# Patient Record
Sex: MALE | URBAN - METROPOLITAN AREA
[De-identification: names, ages, dates, MRNs, and addresses within clinical notes are randomized per-mention and may not be internally consistent; named-entity substitution may affect disease eponyms.]

---

## 2020-02-26 ENCOUNTER — APPOINTMENT (RX ONLY)
Dept: URBAN - METROPOLITAN AREA CLINIC 41 | Facility: CLINIC | Age: 55
Setting detail: DERMATOLOGY
End: 2020-02-26

## 2020-02-26 DIAGNOSIS — I78.8 OTHER DISEASES OF CAPILLARIES: ICD-10-CM

## 2020-02-26 DIAGNOSIS — Z85.828 PERSONAL HISTORY OF OTHER MALIGNANT NEOPLASM OF SKIN: ICD-10-CM

## 2020-02-26 PROBLEM — C44.311 BASAL CELL CARCINOMA OF SKIN OF NOSE: Status: ACTIVE | Noted: 2020-02-26

## 2020-02-26 PROCEDURE — ? COUNSELING

## 2020-02-26 PROCEDURE — 99202 OFFICE O/P NEW SF 15 MIN: CPT

## 2020-02-26 PROCEDURE — ? OTHER

## 2020-02-26 PROCEDURE — ? CONSULTATION FOR MOHS SURGERY

## 2020-02-26 ASSESSMENT — LOCATION DETAILED DESCRIPTION DERM: LOCATION DETAILED: RIGHT NASAL SIDEWALL

## 2020-02-26 ASSESSMENT — LOCATION SIMPLE DESCRIPTION DERM: LOCATION SIMPLE: RIGHT NOSE

## 2020-02-26 ASSESSMENT — LOCATION ZONE DERM: LOCATION ZONE: NOSE

## 2020-02-26 NOTE — PROCEDURE: OTHER
Note Text (......Xxx Chief Complaint.): This diagnosis correlates with the
Detail Level: Simple
Other (Free Text): Patient quoted $150/treatment. May need 2-3 treatments

## 2020-02-26 NOTE — HPI: MOHS SURGERY CONSULTATION
Has The Cancer Been Biopsied Before?: has been previously biopsied
Who Is Your Referring Provider?: Landen Mohr MD
When Was Your Biopsy?: 09/19/2019
Body Location Override (Optional): Right nasal ala

## 2020-02-26 NOTE — PROCEDURE: CONSULTATION FOR MOHS SURGERY
Detail Level: Detailed
Body Location Override (Optional - Billing Will Still Be Based On Selected Body Map Location If Applicable): right nasal ala
Name Of The Referring Provider For Procedure: Landen Mohr MD
Size Of Lesion: 0.3
X Size Of Lesion In Cm (Optional): 0
Incorporate Mauc In Note: Yes

## 2020-05-29 ENCOUNTER — APPOINTMENT (RX ONLY)
Dept: URBAN - METROPOLITAN AREA CLINIC 41 | Facility: CLINIC | Age: 55
Setting detail: DERMATOLOGY
End: 2020-05-29

## 2020-05-29 DIAGNOSIS — Z41.9 ENCOUNTER FOR PROCEDURE FOR PURPOSES OTHER THAN REMEDYING HEALTH STATE, UNSPECIFIED: ICD-10-CM

## 2020-05-29 PROCEDURE — ? OTHER

## 2020-05-29 PROCEDURE — ? COSMETIC CONSULTATION: EXCEL

## 2020-05-29 PROCEDURE — ? CUTERA LASER

## 2020-05-29 ASSESSMENT — LOCATION SIMPLE DESCRIPTION DERM: LOCATION SIMPLE: NOSE

## 2020-05-29 ASSESSMENT — LOCATION ZONE DERM: LOCATION ZONE: NOSE

## 2020-05-29 ASSESSMENT — LOCATION DETAILED DESCRIPTION DERM: LOCATION DETAILED: RIGHT NASAL DORSUM

## 2020-05-29 NOTE — PROCEDURE: CUTERA LASER
Fluence In J/Cm2 (Will Not Render If Blank): 9.0
Consent: Written consent obtained, risks reviewed including but not limited to crusting, scabbing, blistering, scarring, darker or lighter pigmentary change, bruising, and/or incomplete response.
Treatment Number: 1
Repetition Rate (Optional): 0.5 Hz
Post-Procedure: Following the procedure the post care instructions were reviewed with the patient.
Detail Level: Zone
Spot Size (Optional): 5 mm
Post-Care Instructions: I reviewed with the patient in detail post-care instructions. Patient should stay away from the sun and wear sun protection until treated areas are fully healed.
Pre-Procedure: The treatment areas identified by the patient were cleansed and treatment was performed with the parameters mentioned above.
Anesthesia Type: 1% lidocaine with epinephrine

## 2020-05-29 NOTE — PROCEDURE: OTHER
Detail Level: Simple
Other (Free Text): Warned, needs multiple treatments over time and each time would be about 10 days downtime.  [place] in 1 month if desired.  Photoprotect.  Laser balm, sunblock applied after laser.  Recommend regular icing while healing.  Photoprotect.
Note Text (......Xxx Chief Complaint.): This diagnosis correlates with the
